# Patient Record
Sex: MALE
[De-identification: names, ages, dates, MRNs, and addresses within clinical notes are randomized per-mention and may not be internally consistent; named-entity substitution may affect disease eponyms.]

---

## 2022-08-25 ENCOUNTER — APPOINTMENT (OUTPATIENT)
Dept: OTOLARYNGOLOGY | Facility: CLINIC | Age: 32
End: 2022-08-25

## 2022-08-25 VITALS — WEIGHT: 185 LBS | BODY MASS INDEX: 29.03 KG/M2 | TEMPERATURE: 98.5 F | HEIGHT: 67 IN

## 2022-08-25 DIAGNOSIS — J34.89 OTHER SPECIFIED DISORDERS OF NOSE AND NASAL SINUSES: ICD-10-CM

## 2022-08-25 DIAGNOSIS — J34.2 DEVIATED NASAL SEPTUM: ICD-10-CM

## 2022-08-25 DIAGNOSIS — Z78.9 OTHER SPECIFIED HEALTH STATUS: ICD-10-CM

## 2022-08-25 DIAGNOSIS — Z87.891 PERSONAL HISTORY OF NICOTINE DEPENDENCE: ICD-10-CM

## 2022-08-25 DIAGNOSIS — J31.0 CHRONIC RHINITIS: ICD-10-CM

## 2022-08-25 DIAGNOSIS — Z83.438 FAMILY HISTORY OF OTHER DISORDER OF LIPOPROTEIN METABOLISM AND OTHER LIPIDEMIA: ICD-10-CM

## 2022-08-25 DIAGNOSIS — Z75.0 MEDICAL SERVICES NOT AVAILABLE IN HOME: ICD-10-CM

## 2022-08-25 PROBLEM — Z00.00 ENCOUNTER FOR PREVENTIVE HEALTH EXAMINATION: Status: ACTIVE | Noted: 2022-08-25

## 2022-08-25 PROCEDURE — 31231 NASAL ENDOSCOPY DX: CPT

## 2022-08-25 PROCEDURE — 99203 OFFICE O/P NEW LOW 30 MIN: CPT | Mod: 25

## 2022-08-25 NOTE — HISTORY OF PRESENT ILLNESS
[de-identified] : ESCOBAR VERGARA is a 31 year old patient here for right nasal pain when he pinches the nose.  It has been there for a while although he is not sure how long.  He does not have significant nasal obstruction or drainage at this time.  He does have mild nasal congestion.  He has a history of intranasal drug use.  He stopped about 2 months ago.  He also trims the nasal hairs.  He does not have a history of seasonal allergies and does not have recurrent sinus infections.  He has no history of nasal trauma or nasal/sinus surgery.  He does not currently smoke tobacco.

## 2022-08-25 NOTE — ASSESSMENT
[FreeTextEntry1] : He has right nasal pain when he pinches the tip of his nose.  The etiology is unclear.  There are no suspicious masses or lesions on exam.  He does have chronic rhinitis and a deviated septum.\par \par Plan\par -Findings and management options were discussed with the patient.\par -I asked him to avoid nasal manipulation\par -He should avoid plucking or trimming the nasal hairs\par -He may try nasal saline spray and/or moisturizing nasal gel as needed\par -If he has nasal congestion, I recommended nasal steroid spray\par -He should continue to avoid intranasal substance use\par -We are going to monitor the area of concern.  I do not see any obvious suspicious lesions on exam.  It is unclear why the area is uncomfortable when he presses on it.\par -If he has concerns about sinus disease, we will discuss further work-up such as imaging studies\par -I am going to recheck him in about 4 weeks.\par -I would like to see him back earlier if he has any concerns or worsening symptoms Never smoker

## 2022-08-25 NOTE — CONSULT LETTER
[Dear  ___] : Dear  [unfilled], [Consult Letter:] : I had the pleasure of evaluating your patient, [unfilled]. [Please see my note below.] : Please see my note below. [Consult Closing:] : Thank you very much for allowing me to participate in the care of this patient.  If you have any questions, please do not hesitate to contact me. [Sincerely,] : Sincerely, [FreeTextEntry3] : Vicki Garber MD\par

## 2022-09-27 ENCOUNTER — APPOINTMENT (OUTPATIENT)
Dept: OTOLARYNGOLOGY | Facility: CLINIC | Age: 32
End: 2022-09-27

## 2022-12-13 ENCOUNTER — APPOINTMENT (OUTPATIENT)
Dept: OTOLARYNGOLOGY | Facility: CLINIC | Age: 32
End: 2022-12-13

## 2022-12-13 DIAGNOSIS — K11.8 OTHER DISEASES OF SALIVARY GLANDS: ICD-10-CM

## 2022-12-13 PROCEDURE — 99214 OFFICE O/P EST MOD 30 MIN: CPT

## 2022-12-19 PROBLEM — K11.8 PAROTID GLAND FULLNESS: Status: ACTIVE | Noted: 2022-12-19

## 2022-12-19 NOTE — HISTORY OF PRESENT ILLNESS
[de-identified] : He comes in to see me today in follow-up, it is the first time I am evaluating him.\par His chief complaint is "bilateral parotid gland enlargement".\par Is a very detailed and reliable historian.  He reports bilateral worse on the left than the right enlargement of his parotid glands.  He also feels some pain around his left parotid area.\par Clinically he is not acting as if he has an acute parotitis.  There is no fever.  He did have a recent CT scan that described enlargement of his bilateral parotid glands without lesion or lymphadenopathy. Cephalexin Counseling: I counseled the patient regarding use of cephalexin as an antibiotic for prophylactic and/or therapeutic purposes. Cephalexin (commonly prescribed under brand name Keflex) is a cephalosporin antibiotic which is active against numerous classes of bacteria, including most skin bacteria. Side effects may include nausea, diarrhea, gastrointestinal upset, rash, hives, yeast infections, and in rare cases, hepatitis, kidney disease, seizures, fever, confusion, neurologic symptoms, and others. Patients with severe allergies to penicillin medications are cautioned that there is about a 10% incidence of cross-reactivity with cephalosporins. When possible, patients with penicillin allergies should use alternatives to cephalosporins for antibiotic therapy.

## 2022-12-19 NOTE — ASSESSMENT
[FreeTextEntry1] : Clinically I do not see any acute pathology.\par Furthermore I explained to him that this parotid enlargement may be incidental quite possibly from an autoimmune pathology. 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 \par  I have recommended a formal autoimmune work-up.

## 2023-01-03 ENCOUNTER — TRANSCRIPTION ENCOUNTER (OUTPATIENT)
Age: 33
End: 2023-01-03